# Patient Record
Sex: FEMALE | Race: ASIAN | NOT HISPANIC OR LATINO | Employment: FULL TIME | URBAN - METROPOLITAN AREA
[De-identification: names, ages, dates, MRNs, and addresses within clinical notes are randomized per-mention and may not be internally consistent; named-entity substitution may affect disease eponyms.]

---

## 2023-07-13 ENCOUNTER — APPOINTMENT (OUTPATIENT)
Dept: PRIMARY CARE | Facility: CLINIC | Age: 43
End: 2023-07-13
Payer: COMMERCIAL

## 2023-10-18 ENCOUNTER — TELEPHONE (OUTPATIENT)
Dept: PRIMARY CARE | Facility: CLINIC | Age: 43
End: 2023-10-18
Payer: COMMERCIAL

## 2023-10-18 DIAGNOSIS — Z00.00 PHYSICAL EXAM: Primary | ICD-10-CM

## 2023-10-18 DIAGNOSIS — R73.03 PREDIABETES: ICD-10-CM

## 2023-10-18 DIAGNOSIS — E55.9 VITAMIN D DEFICIENCY: ICD-10-CM

## 2023-10-18 PROBLEM — D72.819 LEUKOPENIA: Status: ACTIVE | Noted: 2023-10-18

## 2023-10-18 PROBLEM — L65.0 TELOGEN EFFLUVIUM: Status: ACTIVE | Noted: 2018-01-10

## 2023-10-18 PROBLEM — E78.5 HYPERLIPIDEMIA: Status: ACTIVE | Noted: 2023-10-18

## 2023-10-18 PROBLEM — L30.9 ECZEMA: Status: ACTIVE | Noted: 2023-10-18

## 2023-10-18 PROBLEM — L65.9 HAIR LOSS: Status: ACTIVE | Noted: 2023-10-18

## 2023-10-18 PROBLEM — R79.89 ABNORMAL TSH: Status: ACTIVE | Noted: 2023-10-18

## 2023-10-18 PROBLEM — I78.1 HEMANGIOMA, CAPILLARY: Status: ACTIVE | Noted: 2023-10-18

## 2023-12-01 ENCOUNTER — LAB (OUTPATIENT)
Dept: LAB | Facility: LAB | Age: 43
End: 2023-12-01
Payer: COMMERCIAL

## 2023-12-01 ENCOUNTER — OFFICE VISIT (OUTPATIENT)
Dept: PRIMARY CARE | Facility: CLINIC | Age: 43
End: 2023-12-01
Payer: COMMERCIAL

## 2023-12-01 VITALS
RESPIRATION RATE: 16 BRPM | BODY MASS INDEX: 23.18 KG/M2 | TEMPERATURE: 98.2 F | HEART RATE: 83 BPM | DIASTOLIC BLOOD PRESSURE: 66 MMHG | SYSTOLIC BLOOD PRESSURE: 106 MMHG | OXYGEN SATURATION: 97 % | WEIGHT: 130.8 LBS | HEIGHT: 63 IN

## 2023-12-01 DIAGNOSIS — E55.9 VITAMIN D DEFICIENCY: ICD-10-CM

## 2023-12-01 DIAGNOSIS — E78.2 MIXED HYPERLIPIDEMIA: ICD-10-CM

## 2023-12-01 DIAGNOSIS — Z12.31 BREAST CANCER SCREENING BY MAMMOGRAM: ICD-10-CM

## 2023-12-01 DIAGNOSIS — Z00.00 PHYSICAL EXAM: Primary | ICD-10-CM

## 2023-12-01 DIAGNOSIS — Z00.00 PHYSICAL EXAM: ICD-10-CM

## 2023-12-01 DIAGNOSIS — I78.1 HEMANGIOMA, CAPILLARY: ICD-10-CM

## 2023-12-01 DIAGNOSIS — R73.03 PREDIABETES: ICD-10-CM

## 2023-12-01 LAB
25(OH)D3 SERPL-MCNC: 44 NG/ML (ref 30–100)
ALBUMIN SERPL BCP-MCNC: 4.5 G/DL (ref 3.4–5)
ALP SERPL-CCNC: 71 U/L (ref 33–110)
ALT SERPL W P-5'-P-CCNC: 35 U/L (ref 7–45)
ANION GAP SERPL CALC-SCNC: 13 MMOL/L (ref 10–20)
AST SERPL W P-5'-P-CCNC: 25 U/L (ref 9–39)
BASOPHILS # BLD AUTO: 0.05 X10*3/UL (ref 0–0.1)
BASOPHILS NFR BLD AUTO: 0.9 %
BILIRUB SERPL-MCNC: 0.5 MG/DL (ref 0–1.2)
BUN SERPL-MCNC: 10 MG/DL (ref 6–23)
CALCIUM SERPL-MCNC: 9.6 MG/DL (ref 8.6–10.6)
CHLORIDE SERPL-SCNC: 103 MMOL/L (ref 98–107)
CHOLEST SERPL-MCNC: 224 MG/DL (ref 0–199)
CHOLESTEROL/HDL RATIO: 4.5
CO2 SERPL-SCNC: 27 MMOL/L (ref 21–32)
CREAT SERPL-MCNC: 0.65 MG/DL (ref 0.5–1.05)
EOSINOPHIL # BLD AUTO: 0.46 X10*3/UL (ref 0–0.7)
EOSINOPHIL NFR BLD AUTO: 8.5 %
ERYTHROCYTE [DISTWIDTH] IN BLOOD BY AUTOMATED COUNT: 12.3 % (ref 11.5–14.5)
EST. AVERAGE GLUCOSE BLD GHB EST-MCNC: 103 MG/DL
GFR SERPL CREATININE-BSD FRML MDRD: >90 ML/MIN/1.73M*2
GLUCOSE SERPL-MCNC: 91 MG/DL (ref 74–99)
HBA1C MFR BLD: 5.2 %
HCT VFR BLD AUTO: 40.9 % (ref 36–46)
HDLC SERPL-MCNC: 49.5 MG/DL
HGB BLD-MCNC: 13 G/DL (ref 12–16)
IMM GRANULOCYTES # BLD AUTO: 0.02 X10*3/UL (ref 0–0.7)
IMM GRANULOCYTES NFR BLD AUTO: 0.4 % (ref 0–0.9)
LDLC SERPL CALC-MCNC: 143 MG/DL
LYMPHOCYTES # BLD AUTO: 1.91 X10*3/UL (ref 1.2–4.8)
LYMPHOCYTES NFR BLD AUTO: 35.4 %
MCH RBC QN AUTO: 26.3 PG (ref 26–34)
MCHC RBC AUTO-ENTMCNC: 31.8 G/DL (ref 32–36)
MCV RBC AUTO: 83 FL (ref 80–100)
MONOCYTES # BLD AUTO: 0.47 X10*3/UL (ref 0.1–1)
MONOCYTES NFR BLD AUTO: 8.7 %
NEUTROPHILS # BLD AUTO: 2.48 X10*3/UL (ref 1.2–7.7)
NEUTROPHILS NFR BLD AUTO: 46.1 %
NON HDL CHOLESTEROL: 175 MG/DL (ref 0–149)
NRBC BLD-RTO: 0 /100 WBCS (ref 0–0)
PLATELET # BLD AUTO: 279 X10*3/UL (ref 150–450)
POTASSIUM SERPL-SCNC: 4.1 MMOL/L (ref 3.5–5.3)
PROT SERPL-MCNC: 7.6 G/DL (ref 6.4–8.2)
RBC # BLD AUTO: 4.94 X10*6/UL (ref 4–5.2)
SODIUM SERPL-SCNC: 139 MMOL/L (ref 136–145)
T4 FREE SERPL-MCNC: 1.11 NG/DL (ref 0.78–1.48)
TRIGL SERPL-MCNC: 158 MG/DL (ref 0–149)
TSH SERPL-ACNC: 4.07 MIU/L (ref 0.44–3.98)
VLDL: 32 MG/DL (ref 0–40)
WBC # BLD AUTO: 5.4 X10*3/UL (ref 4.4–11.3)

## 2023-12-01 PROCEDURE — 36415 COLL VENOUS BLD VENIPUNCTURE: CPT

## 2023-12-01 PROCEDURE — 80061 LIPID PANEL: CPT

## 2023-12-01 PROCEDURE — 83036 HEMOGLOBIN GLYCOSYLATED A1C: CPT

## 2023-12-01 PROCEDURE — 1036F TOBACCO NON-USER: CPT | Performed by: FAMILY MEDICINE

## 2023-12-01 PROCEDURE — 82306 VITAMIN D 25 HYDROXY: CPT

## 2023-12-01 PROCEDURE — 99213 OFFICE O/P EST LOW 20 MIN: CPT | Performed by: FAMILY MEDICINE

## 2023-12-01 PROCEDURE — 80053 COMPREHEN METABOLIC PANEL: CPT

## 2023-12-01 PROCEDURE — 99396 PREV VISIT EST AGE 40-64: CPT | Performed by: FAMILY MEDICINE

## 2023-12-01 PROCEDURE — 85025 COMPLETE CBC W/AUTO DIFF WBC: CPT

## 2023-12-01 PROCEDURE — 84443 ASSAY THYROID STIM HORMONE: CPT

## 2023-12-01 PROCEDURE — 84439 ASSAY OF FREE THYROXINE: CPT

## 2023-12-01 ASSESSMENT — PATIENT HEALTH QUESTIONNAIRE - PHQ9
SUM OF ALL RESPONSES TO PHQ9 QUESTIONS 1 AND 2: 0
2. FEELING DOWN, DEPRESSED OR HOPELESS: NOT AT ALL
1. LITTLE INTEREST OR PLEASURE IN DOING THINGS: NOT AT ALL

## 2023-12-01 ASSESSMENT — ENCOUNTER SYMPTOMS
LOSS OF SENSATION IN FEET: 0
OCCASIONAL FEELINGS OF UNSTEADINESS: 0
DEPRESSION: 0

## 2023-12-01 ASSESSMENT — COLUMBIA-SUICIDE SEVERITY RATING SCALE - C-SSRS
6. HAVE YOU EVER DONE ANYTHING, STARTED TO DO ANYTHING, OR PREPARED TO DO ANYTHING TO END YOUR LIFE?: NO
2. HAVE YOU ACTUALLY HAD ANY THOUGHTS OF KILLING YOURSELF?: NO
1. IN THE PAST MONTH, HAVE YOU WISHED YOU WERE DEAD OR WISHED YOU COULD GO TO SLEEP AND NOT WAKE UP?: NO

## 2023-12-01 NOTE — PROGRESS NOTES
"Subjective   Patient ID: Alyssa Greco is a 43 y.o. female who presents for No chief complaint on file..    HPI   The patient presents to the clinic for an annual physical exam and flu vaccine.    The patient reports that she had her labs done today (2023). Was unable to come in and do ahead of time    The patient is not taking any prescription medication. She takes a multivitamin and vitamin D-3 supplement.    The patient's most recent Pap smear was done on 2022. She has never had an abnormal Pap smear.    She states that she had an  (G:3 P:1 A:2) in May 2023 following an unplanned pregnancy. She states that her first period following the  was in 2023. She reports that she delayed her follow-up appointment due to slight feelings of sadness following the . She also reports that she had a miscarriage approximately 7 years ago (~). The patient has one daughter. She is not currently using any contraceptive methods to prevent pregnancy.  Discussed options w her today, having regular periods    The patient states that she notices her bp is low when blood pressure (BP was 106/66 )when checked in the clinic today). She believes that she has been very active in the past.  Exercises regularly.  Asymptomatic, no orthostasis    Hx of prediabetes and elevated lipids, she states exercises regularly, at times eats more cho than she should, usually low fat diet    Saw derm recently, had questions about hemangiomas she is getting and some small areas that have decreased pigment, had skin check    Review of Systems    Objective   /66 (BP Location: Right arm, Patient Position: Sitting, BP Cuff Size: Adult)   Pulse 83   Temp 36.8 °C (98.2 °F)   Resp 16   Ht 1.6 m (5' 3\")   Wt 59.3 kg (130 lb 12.8 oz)   SpO2 97%   BMI 23.17 kg/m²     Physical Exam  Constitutional:       Appearance: Normal appearance.   HENT:      Head: Normocephalic.      Right Ear: Tympanic membrane normal.      " Left Ear: Tympanic membrane normal.      Nose: Nose normal.      Mouth/Throat:      Pharynx: Oropharynx is clear.   Neck:      Comments: No thyromegaly  Cardiovascular:      Rate and Rhythm: Normal rate and regular rhythm.      Pulses: Normal pulses.      Heart sounds: Normal heart sounds.   Pulmonary:      Effort: Pulmonary effort is normal.      Breath sounds: Normal breath sounds.   Abdominal:      General: Bowel sounds are normal.      Palpations: Abdomen is soft. There is no mass.      Tenderness: There is no abdominal tenderness.   Musculoskeletal:         General: No deformity. Normal range of motion.      Cervical back: Normal range of motion and neck supple.      Right lower leg: No edema.      Left lower leg: No edema.   Lymphadenopathy:      Cervical: No cervical adenopathy.   Skin:     General: Skin is warm and dry.   Neurological:      General: No focal deficit present.      Mental Status: She is alert and oriented to person, place, and time.   Psychiatric:         Mood and Affect: Mood normal.         Thought Content: Thought content normal.         Judgment: Judgment normal.         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 The clinic will call the patient upon receiving the results of her labs.    A bilateral mammogram screening was ordered for the patient.    Discussed contraceptive options in detail, discussed can ealily get pregnant again since having reg periods.  She will discuss w   etc    Discussed diet and exercise to control bs and lipids, awaiting lab results    In regards to low blood pressure, the patient was advised to continue monitoring her symptoms for exacerbation. She was advised that her blood pressure is still in normal range. She was advised to continue monitoring her BP at home.    The patient received a flu vaccine in the clinic today.    Scribe Attestation  By signing my name below, INilda Scribe   attest that this documentation has been prepared under the direction and in the presence of Jessie Max DO.

## 2023-12-04 ENCOUNTER — TELEPHONE (OUTPATIENT)
Dept: PRIMARY CARE | Facility: CLINIC | Age: 43
End: 2023-12-04
Payer: COMMERCIAL

## 2023-12-04 DIAGNOSIS — R79.89 ABNORMAL TSH: Primary | ICD-10-CM

## 2023-12-04 NOTE — TELEPHONE ENCOUNTER
Pt had blood work done on the 1st of December, when you can she just wants the results, ill call her when you result them :) thank you

## 2023-12-05 NOTE — TELEPHONE ENCOUNTER
----- Message from Jessie Max DO sent at 12/4/2023  4:36 PM EST -----  Report to pt her blood count is normal, chemistries are normal, and vit d level is normal,  Hgba1c is normal at 5.2.  TSH for thyroid is mildly abnormal, order in chart to recheck in 2-3 months.  LDL/bad cholesterol is high at 143 and should be below 100

## 2023-12-18 ENCOUNTER — APPOINTMENT (OUTPATIENT)
Dept: PRIMARY CARE | Facility: CLINIC | Age: 43
End: 2023-12-18
Payer: COMMERCIAL

## 2024-09-11 ENCOUNTER — TELEPHONE (OUTPATIENT)
Dept: PRIMARY CARE | Facility: CLINIC | Age: 44
End: 2024-09-11
Payer: COMMERCIAL

## 2024-09-11 DIAGNOSIS — E55.9 VITAMIN D DEFICIENCY: ICD-10-CM

## 2024-09-11 DIAGNOSIS — Z00.00 ANNUAL PHYSICAL EXAM: Primary | ICD-10-CM

## 2024-09-11 NOTE — TELEPHONE ENCOUNTER
PATIENT WILL BE COMING IN FOR THEIR YEARLY APPOINTMENT AND WOULD LIKE TO DO THEIR LABS BEFORE COMING IN CAN YOU PLEASE PLACE ORDERS SO YOU CAN REVIEW AT THE UPCOMING APPOINTMENT.    THANK YOU  Physical   11/7/24

## 2024-10-31 ENCOUNTER — APPOINTMENT (OUTPATIENT)
Dept: PRIMARY CARE | Facility: CLINIC | Age: 44
End: 2024-10-31
Payer: COMMERCIAL

## 2024-11-07 ENCOUNTER — APPOINTMENT (OUTPATIENT)
Dept: PRIMARY CARE | Facility: CLINIC | Age: 44
End: 2024-11-07
Payer: COMMERCIAL

## 2024-12-12 ENCOUNTER — APPOINTMENT (OUTPATIENT)
Dept: PRIMARY CARE | Facility: CLINIC | Age: 44
End: 2024-12-12
Payer: COMMERCIAL

## 2025-01-13 ENCOUNTER — OFFICE VISIT (OUTPATIENT)
Dept: URGENT CARE | Age: 45
End: 2025-01-13
Payer: COMMERCIAL

## 2025-01-13 VITALS — HEART RATE: 75 BPM | OXYGEN SATURATION: 99 % | TEMPERATURE: 98.2 F

## 2025-01-13 DIAGNOSIS — S61.011A LACERATION OF RIGHT THUMB WITHOUT FOREIGN BODY WITHOUT DAMAGE TO NAIL, INITIAL ENCOUNTER: Primary | ICD-10-CM

## 2025-01-13 ASSESSMENT — ENCOUNTER SYMPTOMS
WOUND: 1
MUSCULOSKELETAL NEGATIVE: 1
CONSTITUTIONAL NEGATIVE: 1
NUMBNESS: 0
WEAKNESS: 0

## 2025-01-14 NOTE — PROGRESS NOTES
man no pain when Subjective   Patient ID: Alyssa Greco is a 44 y.o. female. They present today with a chief complaint of Injury (Cut right thumb).    History of Present Illness  Laceration to the right thumb.  Patient was washing a carrot iliana and the blade accidentally cut the flexor aspect of her right thumb.  Denies numbness or tingling.  Denies foreign body sensation.  She is right-hand dominant.      History provided by:  Patient   used: No    Injury      Past Medical History  Allergies as of 01/13/2025    (No Known Allergies)       (Not in a hospital admission)       Past Medical History:   Diagnosis Date    Encounter for general adult medical examination without abnormal findings 10/19/2017    Physical exam, routine    Encounter for general adult medical examination without abnormal findings 11/01/2018    Physical exam, routine    Pain in unspecified lower leg 07/10/2019    Calf pain       History reviewed. No pertinent surgical history.     reports that she has never smoked. She has never used smokeless tobacco. She reports that she does not drink alcohol and does not use drugs.    Review of Systems  Review of Systems   Constitutional: Negative.    Musculoskeletal: Negative.    Skin:  Positive for wound.   Neurological:  Negative for weakness and numbness.                                  Objective    Vitals:    01/13/25 1906   Pulse: 75   Temp: 36.8 °C (98.2 °F)   SpO2: 99%     No LMP recorded.    Physical Exam  Vitals and nursing note reviewed.   Constitutional:       Comments: Very pleasant and cooperative 44-year-old female in no acute distress.  She is here with a male family member   Skin:     Comments: 1.5 cm partial-thickness oblique laceration of the flexor aspect of the right near the IP joint.  Clean.  I am unable to separate the wound is with traction on the edges.  No foreign body.   Neurological:      General: No focal deficit present.      Mental Status: She is alert.    Psychiatric:         Mood and Affect: Mood normal.         Behavior: Behavior normal.         Laceration Repair    Date/Time: 1/13/2025 7:32 PM    Performed by: Michelle Waterman PA-C  Authorized by: Michelle Waterman PA-C    Consent:     Consent obtained:  Verbal    Consent given by:  Patient    Risks, benefits, and alternatives were discussed: yes      Risks discussed:  Infection  Universal protocol:     Procedure explained and questions answered to patient or proxy's satisfaction: yes      Relevant documents present and verified: no      Test results available: no      Imaging studies available: no      Required blood products, implants, devices, and special equipment available: no      Site/side marked: no      Immediately prior to procedure, a time out was called: no      Patient identity confirmed:  Verbally with patient  Anesthesia:     Anesthesia method:  None  Laceration details:     Location:  Finger    Finger location:  R thumb    Length (cm):  1    Depth (mm):  1  Exploration:     Hemostasis achieved with:  Direct pressure    Imaging outcome: foreign body not noted      Wound exploration: wound explored through full range of motion and entire depth of wound visualized      Wound extent: no nerve damage noted and no tendon damage noted      Contaminated: no    Treatment:     Area cleansed with:  Shur-Clens and saline    Amount of cleaning:  Standard    Irrigation solution:  Tap water    Irrigation method:  Syringe    Visualized foreign bodies/material removed: no      Debridement:  None    Undermining:  None    Scar revision: no    Skin repair:     Repair method:  Steri-Strips    Number of Steri-Strips:  3  Approximation:     Approximation:  Loose  Repair type:     Repair type:  Simple  Post-procedure details:     Dressing:  Non-adherent dressing and tube gauze      Point of Care Test & Imaging Results from this visit  No results found for this visit on 01/13/25.   No results found.    Diagnostic  study results (if any) were reviewed by Michelle Waterman PA-C.    Assessment/Plan   Allergies, medications, history, and pertinent labs/EKGs/Imaging reviewed by Michelle Waterman PA-C.     Medical Decision Making  History and physical examination are consistent with an accidental laceration of the right thumb.  The wound was evaluated and Steri-Strips were applied.  Tetanus was updated.  She will follow-up with her PCP as needed.  We discussed reasons to go to the emergency department, most pacifically any signs or symptoms of infection.  She was offered a work excuse for no use of the thumb for the next few days and she agreed to this.    Orders and Diagnoses  Diagnoses and all orders for this visit:  Laceration of right thumb without foreign body without damage to nail, initial encounter  Other orders  -     Tdap vaccine, age 7 years and older  (BOOSTRIX)      Medical Admin Record      Patient disposition: Home    Electronically signed by Michelle Waterman PA-C  7:28 PM

## 2025-01-16 ENCOUNTER — APPOINTMENT (OUTPATIENT)
Dept: PRIMARY CARE | Facility: CLINIC | Age: 45
End: 2025-01-16
Payer: COMMERCIAL

## 2025-01-29 ENCOUNTER — APPOINTMENT (OUTPATIENT)
Dept: PRIMARY CARE | Facility: CLINIC | Age: 45
End: 2025-01-29
Payer: COMMERCIAL

## 2025-02-05 ENCOUNTER — PATIENT MESSAGE (OUTPATIENT)
Dept: PRIMARY CARE | Facility: CLINIC | Age: 45
End: 2025-02-05
Payer: COMMERCIAL

## 2025-02-05 LAB
25(OH)D3+25(OH)D2 SERPL-MCNC: 52 NG/ML (ref 30–100)
ALBUMIN SERPL-MCNC: 4.4 G/DL (ref 3.6–5.1)
ALP SERPL-CCNC: 64 U/L (ref 31–125)
ALT SERPL-CCNC: 15 U/L (ref 6–29)
ANION GAP SERPL CALCULATED.4IONS-SCNC: 11 MMOL/L (CALC) (ref 7–17)
AST SERPL-CCNC: 18 U/L (ref 10–30)
BILIRUB SERPL-MCNC: 0.6 MG/DL (ref 0.2–1.2)
BUN SERPL-MCNC: 9 MG/DL (ref 7–25)
CALCIUM SERPL-MCNC: 9.1 MG/DL (ref 8.6–10.2)
CHLORIDE SERPL-SCNC: 102 MMOL/L (ref 98–110)
CHOLEST SERPL-MCNC: 216 MG/DL
CHOLEST/HDLC SERPL: 3.9 (CALC)
CO2 SERPL-SCNC: 25 MMOL/L (ref 20–32)
CREAT SERPL-MCNC: 0.63 MG/DL (ref 0.5–0.99)
EGFRCR SERPLBLD CKD-EPI 2021: 113 ML/MIN/1.73M2
ERYTHROCYTE [DISTWIDTH] IN BLOOD BY AUTOMATED COUNT: 12.9 % (ref 11–15)
GLUCOSE SERPL-MCNC: 102 MG/DL (ref 65–99)
HCT VFR BLD AUTO: 40.7 % (ref 35–45)
HDLC SERPL-MCNC: 55 MG/DL
HGB BLD-MCNC: 13.2 G/DL (ref 11.7–15.5)
LDLC SERPL CALC-MCNC: 135 MG/DL (CALC)
MCH RBC QN AUTO: 26.6 PG (ref 27–33)
MCHC RBC AUTO-ENTMCNC: 32.4 G/DL (ref 32–36)
MCV RBC AUTO: 82.1 FL (ref 80–100)
NONHDLC SERPL-MCNC: 161 MG/DL (CALC)
PLATELET # BLD AUTO: 289 THOUSAND/UL (ref 140–400)
PMV BLD REES-ECKER: 12.2 FL (ref 7.5–12.5)
POTASSIUM SERPL-SCNC: 4.2 MMOL/L (ref 3.5–5.3)
PROT SERPL-MCNC: 7.4 G/DL (ref 6.1–8.1)
RBC # BLD AUTO: 4.96 MILLION/UL (ref 3.8–5.1)
SODIUM SERPL-SCNC: 138 MMOL/L (ref 135–146)
T4 FREE SERPL-MCNC: 1.1 NG/DL (ref 0.8–1.8)
TRIGL SERPL-MCNC: 135 MG/DL
TSH SERPL-ACNC: 6.14 MIU/L
WBC # BLD AUTO: 5.4 THOUSAND/UL (ref 3.8–10.8)

## 2025-02-11 ENCOUNTER — OFFICE VISIT (OUTPATIENT)
Dept: PRIMARY CARE | Facility: CLINIC | Age: 45
End: 2025-02-11
Payer: COMMERCIAL

## 2025-02-11 VITALS
RESPIRATION RATE: 12 BRPM | SYSTOLIC BLOOD PRESSURE: 132 MMHG | DIASTOLIC BLOOD PRESSURE: 82 MMHG | HEART RATE: 88 BPM | WEIGHT: 134 LBS | BODY MASS INDEX: 23.74 KG/M2 | OXYGEN SATURATION: 99 % | HEIGHT: 63 IN

## 2025-02-11 DIAGNOSIS — E03.8 SUBCLINICAL HYPOTHYROIDISM: ICD-10-CM

## 2025-02-11 DIAGNOSIS — R73.01 IFG (IMPAIRED FASTING GLUCOSE): Primary | ICD-10-CM

## 2025-02-11 DIAGNOSIS — E78.00 ELEVATED LDL CHOLESTEROL LEVEL: ICD-10-CM

## 2025-02-11 PROCEDURE — 1036F TOBACCO NON-USER: CPT | Performed by: FAMILY MEDICINE

## 2025-02-11 PROCEDURE — 3008F BODY MASS INDEX DOCD: CPT | Performed by: FAMILY MEDICINE

## 2025-02-11 PROCEDURE — 99213 OFFICE O/P EST LOW 20 MIN: CPT | Performed by: FAMILY MEDICINE

## 2025-02-11 RX ORDER — CYANOCOBALAMIN (VITAMIN B-12) 250 MCG
250 TABLET ORAL DAILY
COMMUNITY

## 2025-02-11 RX ORDER — CHOLECALCIFEROL (VITAMIN D3) 25 MCG
1000 TABLET ORAL DAILY
COMMUNITY

## 2025-02-11 RX ORDER — BISMUTH SUBSALICYLATE 262 MG
1 TABLET,CHEWABLE ORAL DAILY
COMMUNITY

## 2025-02-11 ASSESSMENT — ENCOUNTER SYMPTOMS
WHEEZING: 0
ABDOMINAL PAIN: 0
ADENOPATHY: 0
SINUS PRESSURE: 0
FATIGUE: 0
BRUISES/BLEEDS EASILY: 0
SLEEP DISTURBANCE: 0
NAUSEA: 0
SHORTNESS OF BREATH: 0
ABDOMINAL DISTENTION: 0
CHEST TIGHTNESS: 0
CHILLS: 0
DIARRHEA: 0
DYSPHORIC MOOD: 0
APPETITE CHANGE: 0
MYALGIAS: 0
DYSURIA: 0
LIGHT-HEADEDNESS: 0
FEVER: 0
NERVOUS/ANXIOUS: 0
DIFFICULTY URINATING: 0
ARTHRALGIAS: 0
HEADACHES: 0

## 2025-02-11 NOTE — PROGRESS NOTES
"Subjective   Patient ID: Alyssa Greco is a 44 y.o. female who presents for Follow-up.    HPI     Review of Systems    Objective   /82   Pulse 88   Resp 12   Ht 1.6 m (5' 3\")   Wt 60.8 kg (134 lb)   SpO2 99%   BMI 23.74 kg/m²     Physical Exam    Assessment/Plan          "

## 2025-02-11 NOTE — PROGRESS NOTES
"Subjective   Patient ID: Alyssa Greco is a 44 y.o. female who presents for Follow-up.  Pt has IFG with fasting blood sugar 102. Pt is usually following low carb diet and is exercising regularly 5-7 days per week. Weight is stable.    Pt has Dyslipidemia.   Lipid panel showed .  Currently taking no medication.     Pt has chronic, stable subclinical hypothyroidism.   Taking no medication  TSH was 6, with Free T4 1.1.  Energy is  good. Pt denies significant weight gain, low mood, constipation, or skin changes.   Medication is tolerated well without anxiety, tremors, palpitations, involuntary weight loss, heat intolerance or diarrhea.         Review of Systems   Constitutional:  Negative for appetite change, chills, fatigue and fever.   HENT:  Negative for congestion, ear pain and sinus pressure.    Eyes:  Negative for visual disturbance.   Respiratory:  Negative for chest tightness, shortness of breath and wheezing.    Cardiovascular:  Negative for chest pain.   Gastrointestinal:  Negative for abdominal distention, abdominal pain, diarrhea and nausea.   Genitourinary:  Negative for difficulty urinating, dysuria and pelvic pain.   Musculoskeletal:  Negative for arthralgias and myalgias.   Skin:  Negative for rash.   Allergic/Immunologic: Negative for immunocompromised state.   Neurological:  Negative for light-headedness and headaches.   Hematological:  Negative for adenopathy. Does not bruise/bleed easily.   Psychiatric/Behavioral:  Negative for dysphoric mood and sleep disturbance. The patient is not nervous/anxious.        Objective   /82   Pulse 88   Resp 12   Ht 1.6 m (5' 3\")   Wt 60.8 kg (134 lb)   SpO2 99%   BMI 23.74 kg/m²    Physical Exam  Constitutional:       General: She is not in acute distress.     Appearance: Normal appearance.   Cardiovascular:      Rate and Rhythm: Normal rate and regular rhythm.      Heart sounds: Normal heart sounds. No murmur heard.  Pulmonary:      Effort: Pulmonary " effort is normal.      Breath sounds: Normal breath sounds.   Abdominal:      Palpations: Abdomen is soft.      Tenderness: There is no abdominal tenderness.   Neurological:      Mental Status: She is alert.   Psychiatric:         Mood and Affect: Mood normal.         Judgment: Judgment normal.           Assessment/Plan   Diagnoses and all orders for this visit:  IFG (impaired fasting glucose) - mild, discussed goal for low sugar/carb diet and regular exercise  Elevated LDL cholesterol level - mild, discussed option of CAC score to help determine if treatment is needed  Subclinical hypothyroidism - asymptomatic with normal thyroid hormone level, discussed that there is no treatment needed.     Follow up as planned with Dr Khan

## 2025-04-25 ENCOUNTER — APPOINTMENT (OUTPATIENT)
Dept: PRIMARY CARE | Facility: CLINIC | Age: 45
End: 2025-04-25
Payer: COMMERCIAL

## 2025-04-25 VITALS
WEIGHT: 132 LBS | SYSTOLIC BLOOD PRESSURE: 120 MMHG | DIASTOLIC BLOOD PRESSURE: 80 MMHG | HEIGHT: 63 IN | BODY MASS INDEX: 23.39 KG/M2 | HEART RATE: 75 BPM | OXYGEN SATURATION: 99 %

## 2025-04-25 DIAGNOSIS — Z00.00 PHYSICAL EXAM: Primary | ICD-10-CM

## 2025-04-25 DIAGNOSIS — R73.03 PREDIABETES: ICD-10-CM

## 2025-04-25 DIAGNOSIS — E78.2 MIXED HYPERLIPIDEMIA: ICD-10-CM

## 2025-04-25 DIAGNOSIS — Z12.31 ENCOUNTER FOR SCREENING MAMMOGRAM FOR MALIGNANT NEOPLASM OF BREAST: ICD-10-CM

## 2025-04-25 DIAGNOSIS — R79.89 ABNORMAL TSH: ICD-10-CM

## 2025-04-25 PROCEDURE — 3008F BODY MASS INDEX DOCD: CPT | Performed by: FAMILY MEDICINE

## 2025-04-25 PROCEDURE — 99396 PREV VISIT EST AGE 40-64: CPT | Performed by: FAMILY MEDICINE

## 2025-04-25 PROCEDURE — 1036F TOBACCO NON-USER: CPT | Performed by: FAMILY MEDICINE

## 2025-04-25 PROCEDURE — 99213 OFFICE O/P EST LOW 20 MIN: CPT | Performed by: FAMILY MEDICINE

## 2025-04-25 ASSESSMENT — PATIENT HEALTH QUESTIONNAIRE - PHQ9
SUM OF ALL RESPONSES TO PHQ QUESTIONS 1-9: 0
9. THOUGHTS THAT YOU WOULD BE BETTER OFF DEAD, OR OF HURTING YOURSELF: NOT AT ALL
6. FEELING BAD ABOUT YOURSELF - OR THAT YOU ARE A FAILURE OR HAVE LET YOURSELF OR YOUR FAMILY DOWN: NOT AT ALL
5. POOR APPETITE OR OVEREATING: NOT AT ALL
4. FEELING TIRED OR HAVING LITTLE ENERGY: NOT AT ALL
8. MOVING OR SPEAKING SO SLOWLY THAT OTHER PEOPLE COULD HAVE NOTICED. OR THE OPPOSITE, BEING SO FIGETY OR RESTLESS THAT YOU HAVE BEEN MOVING AROUND A LOT MORE THAN USUAL: NOT AT ALL
10. IF YOU CHECKED OFF ANY PROBLEMS, HOW DIFFICULT HAVE THESE PROBLEMS MADE IT FOR YOU TO DO YOUR WORK, TAKE CARE OF THINGS AT HOME, OR GET ALONG WITH OTHER PEOPLE: NOT DIFFICULT AT ALL
1. LITTLE INTEREST OR PLEASURE IN DOING THINGS: NOT AT ALL
3. TROUBLE FALLING OR STAYING ASLEEP OR SLEEPING TOO MUCH: NOT AT ALL
SUM OF ALL RESPONSES TO PHQ9 QUESTIONS 1 AND 2: 0
7. TROUBLE CONCENTRATING ON THINGS, SUCH AS READING THE NEWSPAPER OR WATCHING TELEVISION: NOT AT ALL
2. FEELING DOWN, DEPRESSED OR HOPELESS: NOT AT ALL

## 2025-04-25 ASSESSMENT — ANXIETY QUESTIONNAIRES
IF YOU CHECKED OFF ANY PROBLEMS ON THIS QUESTIONNAIRE, HOW DIFFICULT HAVE THESE PROBLEMS MADE IT FOR YOU TO DO YOUR WORK, TAKE CARE OF THINGS AT HOME, OR GET ALONG WITH OTHER PEOPLE: NOT DIFFICULT AT ALL
4. TROUBLE RELAXING: NOT AT ALL
1. FEELING NERVOUS, ANXIOUS, OR ON EDGE: NOT AT ALL
2. NOT BEING ABLE TO STOP OR CONTROL WORRYING: NOT AT ALL
3. WORRYING TOO MUCH ABOUT DIFFERENT THINGS: NOT AT ALL
GAD7 TOTAL SCORE: 0
6. BECOMING EASILY ANNOYED OR IRRITABLE: NOT AT ALL
7. FEELING AFRAID AS IF SOMETHING AWFUL MIGHT HAPPEN: NOT AT ALL
5. BEING SO RESTLESS THAT IT IS HARD TO SIT STILL: NOT AT ALL

## 2025-04-25 NOTE — PROGRESS NOTES
"Subjective   Patient ID: Alyssa Greco is a 44 y.o. female who presents for Annual Exam.    HPI   Patient is in the office today for a follow-up and cpe.  She would like to discuss her lab results from 02/04/2025. Her tsh is elevated again mildly.  BS is in prediabetic range and lipids elevated but improved from prior.  She states she has made some diet changes and was walking for exercise and is now planning on starting exercise again w nice weather    She has personal history of vitamin D deficiency which is treated with Vitamin D OTC 1000 units. She denies any side effects to the supplement. Her last vitamin D from 02/04/2025 was 52.    She has personal history of hyperlipidemia which is treated with diet and exercise. Her last lipid panel from 02/04/2025 showed: , HDL 55, Triglycerides 135,  which improved from her last lipid panel, she states that she improved her diet, and is walking more.    Her last lab results noted her TSH elevated at 6.14, T4 1.1. She would like to repeat the lab tests. She had neg thyroid antibodies in the past    She is overdue for her first mammogram, and states that she will schedule it.  Will order today.  She is due for pap in Sept of this year    Review of Systems    Objective   /80 (BP Location: Left arm, Patient Position: Sitting, BP Cuff Size: Adult)   Pulse 75   Ht 1.6 m (5' 3\")   Wt 59.9 kg (132 lb)   SpO2 99%   BMI 23.38 kg/m²     Physical Exam  Constitutional:       Appearance: Normal appearance.   HENT:      Head: Normocephalic.      Right Ear: Tympanic membrane normal.      Left Ear: Tympanic membrane normal.      Mouth/Throat:      Pharynx: Oropharynx is clear.   Neck:      Comments: No thyromegaly  Cardiovascular:      Rate and Rhythm: Normal rate and regular rhythm.      Pulses: Normal pulses.      Heart sounds: Normal heart sounds.   Pulmonary:      Effort: Pulmonary effort is normal.      Breath sounds: Normal breath sounds.   Abdominal:      " General: Bowel sounds are normal.      Palpations: Abdomen is soft. There is no mass.      Tenderness: There is no abdominal tenderness.   Musculoskeletal:         General: Normal range of motion.      Cervical back: Normal range of motion and neck supple.   Lymphadenopathy:      Cervical: No cervical adenopathy.   Skin:     General: Skin is warm and dry.      Findings: No rash.   Neurological:      General: No focal deficit present.      Mental Status: She is alert and oriented to person, place, and time.   Psychiatric:         Mood and Affect: Mood normal.         Behavior: Behavior normal.         Thought Content: Thought content normal.         Judgment: Judgment normal.         Assessment/Plan   Diagnoses and all orders for this visit:  Physical exam  Mixed hyperlipidemia  -     Comprehensive Metabolic Panel; Future  -     Lipid Panel; Future  Abnormal TSH  -     TSH with reflex to Free T4 if abnormal; Future  Prediabetes  Encounter for screening mammogram for malignant neoplasm of breast  -     BI mammo bilateral screening tomosynthesis; Future    Discussed lab results from 02/04/2025 which showed: vitamin D 52, , HDL 55, Triglycerides 135, , TSH 6.14, T4 1.1.  Recommended repeat TSH in 6 months since subclinical hypothyroid level at this time.   Recommended lab tests for follow-up prior to OV/PAP in Sept.     Mamm ordered, pt will schedule prior to next OV   Continue present supplements, pt not on prescription meds  Discussed diet and exercise to help her lipids and BS, will check hgba1c w next labs also  Follow-up in September 2025, call us if needed sooner. Labs prior    Scribe Attestation  By signing my name below, ITye , Scriblashell   attest that this documentation has been prepared under the direction and in the presence of Jessie Max DO.

## 2025-09-02 ENCOUNTER — TELEPHONE (OUTPATIENT)
Dept: PRIMARY CARE | Facility: CLINIC | Age: 45
End: 2025-09-02
Payer: COMMERCIAL

## 2025-09-04 ENCOUNTER — TELEPHONE (OUTPATIENT)
Dept: PRIMARY CARE | Facility: CLINIC | Age: 45
End: 2025-09-04
Payer: COMMERCIAL

## 2025-09-08 ENCOUNTER — APPOINTMENT (OUTPATIENT)
Dept: PRIMARY CARE | Facility: CLINIC | Age: 45
End: 2025-09-08
Payer: COMMERCIAL

## 2025-10-03 ENCOUNTER — APPOINTMENT (OUTPATIENT)
Dept: PRIMARY CARE | Facility: CLINIC | Age: 45
End: 2025-10-03
Payer: COMMERCIAL